# Patient Record
Sex: FEMALE | URBAN - METROPOLITAN AREA
[De-identification: names, ages, dates, MRNs, and addresses within clinical notes are randomized per-mention and may not be internally consistent; named-entity substitution may affect disease eponyms.]

---

## 2017-11-06 ENCOUNTER — TELEPHONE (OUTPATIENT)
Dept: TRANSPLANT | Facility: CLINIC | Age: 62
End: 2017-11-06

## 2017-11-07 NOTE — TELEPHONE ENCOUNTER
Initial referral received via fax from Dr Svitlana Calvillo's office. [place]   Patient with Cryptogenic cirrhosis.  MELD 27-30   Referred for liver transplant for EVALUATION.    Referral completed and forwarded to Cary Duarte, [facility].      Insurance: [place]. Pt inpatient. I am not able to obtain copy of the card at of this message.  I was told she was to be discharged to here as outpa. Urgent referral.   Contact # Lawson Wheeler RN  [phone]  Fax [phone]  [email or web address]    Vini Dockery   Transplant Associate, Liver   [facility]   [facility]   [facility] [place] [facility]   [address], [place] [address]   [phone] (office)   [phone] (tie-line)   [phone] (efax)   Email: [email or web address]